# Patient Record
Sex: FEMALE | Race: OTHER
[De-identification: names, ages, dates, MRNs, and addresses within clinical notes are randomized per-mention and may not be internally consistent; named-entity substitution may affect disease eponyms.]

---

## 2020-04-20 ENCOUNTER — HOSPITAL ENCOUNTER (EMERGENCY)
Dept: HOSPITAL 93 - ER | Age: 45
Discharge: HOME | End: 2020-04-20
Payer: COMMERCIAL

## 2020-04-20 VITALS — HEIGHT: 65 IN | WEIGHT: 128 LBS | BODY MASS INDEX: 21.33 KG/M2

## 2020-04-20 DIAGNOSIS — N76.4: Primary | ICD-10-CM

## 2020-05-22 ENCOUNTER — HOSPITAL ENCOUNTER (OUTPATIENT)
Dept: HOSPITAL 93 - CIR.AMB | Age: 45
Discharge: HOME | End: 2020-05-22
Attending: COLON & RECTAL SURGERY
Payer: COMMERCIAL

## 2020-05-22 DIAGNOSIS — K62.4: ICD-10-CM

## 2020-05-22 DIAGNOSIS — K62.89: ICD-10-CM

## 2020-05-22 DIAGNOSIS — K50.111: Primary | ICD-10-CM

## 2021-11-04 NOTE — NUR
SE RECIBE PTE ALERTA Y ORIENTADA X3,REFIERE TENER DOLOR ANDOMINAL ES PTE DE
CROHN,LA REFIERE EL DR.NICOLAS MACEDO PARA REALIZARLE ANGELINA COLOSTOMIA.

## 2021-11-04 NOTE — NUR
PACIENTE EVALUADA POR EL  QUIEN ORDENA [place] MEDICO.SE ORIENTA A
PACIENTE SOBRE EL MISMO ESTA REFIERE ENTENDER. SE REALIZAN MUESTRAS DE
LABORATORIO BAJO MEDIDAS ASEPTICAS Y SE ADMINISTRAN MEDICAMENTOS BULL ORDEN
MEDICA. SE ORIENTA SOBRE CT CON CONTRASTE.

## 2023-04-29 ENCOUNTER — HOSPITAL ENCOUNTER (INPATIENT)
Dept: HOSPITAL 93 - ER | Age: 48
LOS: 2 days | Discharge: LEFT BEFORE DISCHARGE | DRG: 392 | End: 2023-05-01
Attending: COLON & RECTAL SURGERY | Admitting: COLON & RECTAL SURGERY
Payer: COMMERCIAL

## 2023-04-29 VITALS — HEIGHT: 64 IN | WEIGHT: 140 LBS | BODY MASS INDEX: 23.9 KG/M2

## 2023-04-29 DIAGNOSIS — K50.90: ICD-10-CM

## 2023-04-29 DIAGNOSIS — Z20.822: ICD-10-CM

## 2023-04-29 DIAGNOSIS — K52.89: Primary | ICD-10-CM

## 2023-04-29 NOTE — NUR
PTE ES EVALUADO POR DR ALMONTE. PTE SE ORIENTA SOBRE TRATAMIENTO MEDICO Y
VERBALIZA QUE ACEPTA. SE EJECUTA ORDEN MEDICA EN LÓPEZ TOTALIDAD.